# Patient Record
Sex: FEMALE | Race: WHITE
[De-identification: names, ages, dates, MRNs, and addresses within clinical notes are randomized per-mention and may not be internally consistent; named-entity substitution may affect disease eponyms.]

---

## 2018-07-13 ENCOUNTER — HOSPITAL ENCOUNTER (OUTPATIENT)
Dept: HOSPITAL 89 - MAMO | Age: 43
End: 2018-07-13
Attending: OBSTETRICS & GYNECOLOGY
Payer: COMMERCIAL

## 2018-07-13 DIAGNOSIS — Z12.31: Primary | ICD-10-CM

## 2018-07-13 PROCEDURE — 77063 BREAST TOMOSYNTHESIS BI: CPT

## 2018-07-13 PROCEDURE — 77067 SCR MAMMO BI INCL CAD: CPT

## 2018-07-17 NOTE — RADIOLOGY IMAGING REPORT
FACILITY: [facility] 

 

PATIENT NAME: LESLIE MOREJON

: [id number]

MR: [id number]

V: [id number]

EXAM DATE: [id number]

ORDERING PHYSICIAN: DERRICK FABIAN

TECHNOLOGIST: Annmarie Whiteside

 

PROCEDURE:BILATERAL DIGITAL SCREENING MAMMOGRAM WITH CAD ASSISTED 

INTERPRETATION & 3D TOMOSYNTHESIS 

 

COMPARISON:Baseline

 

INDICATIONS:SCREENING

 

FINDINGS:  

The breast tissue is extremely dense. There is no dominant mass, 

suspicious cluster of microcalcifications or persistent areas of 

architectural distortion.

 

DIAGNOSTIC CATEGORY 1--NEGATIVE.  

 

RECOMMENDATIONS:

ROUTINE MAMMOGRAM AND CLINICAL EVALUATION IN 1 YEAR.   

 

IMPRESSION:

BIRADS 1: Negative 

 

 

 

 

 

 

 

 

Dictated by:  Thomas Dunphy M.D. on 2018 at 8:57   

Transcribed by: FIX on 2018 at 9:36    

Approved by:  Thomas Dunphy M.D. on 2018 at 11:17   

[facility]

## 2024-01-29 ENCOUNTER — APPOINTMENT (OUTPATIENT)
Dept: URBAN - METROPOLITAN AREA CLINIC 217 | Age: 49
Setting detail: DERMATOLOGY
End: 2024-01-29

## 2024-01-29 DIAGNOSIS — L65.9 NONSCARRING HAIR LOSS, UNSPECIFIED: ICD-10-CM

## 2024-01-29 DIAGNOSIS — L81.4 OTHER MELANIN HYPERPIGMENTATION: ICD-10-CM

## 2024-01-29 PROBLEM — D37.01 NEOPLASM OF UNCERTAIN BEHAVIOR OF LIP: Status: ACTIVE | Noted: 2024-01-29

## 2024-01-29 PROCEDURE — 99204 OFFICE O/P NEW MOD 45 MIN: CPT

## 2024-01-29 PROCEDURE — OTHER DIAGNOSIS COMMENT: OTHER

## 2024-01-29 PROCEDURE — OTHER MIPS QUALITY: OTHER

## 2024-01-29 PROCEDURE — OTHER COUNSELING: OTHER

## 2024-01-29 PROCEDURE — OTHER OBSERVATION: OTHER

## 2024-01-29 PROCEDURE — OTHER TREATMENT REGIMEN: OTHER

## 2024-01-29 PROCEDURE — OTHER OBSERVATION AND MEASURE: OTHER

## 2024-01-29 PROCEDURE — OTHER ORDER TESTS: OTHER

## 2024-01-29 ASSESSMENT — LOCATION ZONE DERM
LOCATION ZONE: LIP
LOCATION ZONE: LIP

## 2024-01-29 ASSESSMENT — LOCATION DETAILED DESCRIPTION DERM
LOCATION DETAILED: LEFT INFERIOR VERMILION LIP
LOCATION DETAILED: LEFT INFERIOR VERMILION LIP

## 2024-01-29 ASSESSMENT — LOCATION SIMPLE DESCRIPTION DERM
LOCATION SIMPLE: LEFT LIP
LOCATION SIMPLE: LEFT LIP

## 2024-01-29 NOTE — PROCEDURE: DIAGNOSIS COMMENT
Render Risk Assessment In Note?: yes
Detail Level: Detailed
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 Family history hair loss (father). \\nR/o systemic disease.  HX anemia and Raynaud’s per patient. \\nDoes not seem to be affected by mechanical factors, stress.

## 2024-01-29 NOTE — PROCEDURE: ORDER TESTS
Billing Type: Third-Party Bill
Performing Laboratory: 0
Expected Date Of Service: 01/29/2024
Bill For Surgical Tray: no

## 2024-01-29 NOTE — PROCEDURE: TREATMENT REGIMEN
Detail Level: Zone
Plan: Discussed treatment options including PRP, low dose minoxidil. Patient and I mutually agree to OTC regimen.\\nWill also screen for any correctable contributory medical conditions that might be causing hairloss
Otc Regimen: Nutrafol oral supplements\\nMinoxidil 5% as directed topically
Initiate Treatment: Minoxidil 5% as directed topically

## 2024-01-29 NOTE — HPI: HAIR LOSS
How Did The Hair Loss Occur?: gradual in onset
How Severe Is Your Hair Loss?: mild
Additional History: Patient here for diffuse hairloss with a family Hx of hairloss ( father), Hx of anemia but no recent lab work,negative stress component or mechanical component. She does color her hair but air dries hair after washing  and infrequent curling. Positive for Raynaud’s disease. She does state she has scaly areas on the left fore head and behind ears at times that is itchy.  Selsum blue seems to help this issue.

## 2024-04-04 ENCOUNTER — RX ONLY (RX ONLY)
Age: 49
End: 2024-04-04

## 2024-04-04 RX ORDER — SPIRONOLACTONE 25 MG/5ML
SUSPENSION ORAL
Qty: 60 | Refills: 3 | Status: ERX | COMMUNITY
Start: 2024-04-04

## 2024-07-30 ENCOUNTER — APPOINTMENT (OUTPATIENT)
Dept: URBAN - METROPOLITAN AREA CLINIC 217 | Age: 49
Setting detail: DERMATOLOGY
End: 2024-07-30

## 2024-07-30 DIAGNOSIS — L65.9 NONSCARRING HAIR LOSS, UNSPECIFIED: ICD-10-CM

## 2024-07-30 PROCEDURE — OTHER DIAGNOSIS COMMENT: OTHER

## 2024-07-30 PROCEDURE — OTHER COUNSELING: OTHER

## 2024-07-30 PROCEDURE — 99214 OFFICE O/P EST MOD 30 MIN: CPT

## 2024-07-30 PROCEDURE — OTHER PRESCRIPTION: OTHER

## 2024-07-30 PROCEDURE — OTHER TREATMENT REGIMEN: OTHER

## 2024-07-30 RX ORDER — MINOXIDIL 2.5 MG/1
TABLET ORAL
Qty: 30 | Refills: 5 | Status: ERX | COMMUNITY
Start: 2024-07-30

## 2024-07-30 ASSESSMENT — SEVERITY ASSESSMENT OVERALL AMONG ALL PATIENTS
IN YOUR EXPERIENCE, AMONG ALL PATIENTS YOU HAVE SEEN WITH THIS CONDITION, HOW SEVERE IS THIS PATIENT'S CONDITION?: S1 (LESS THAN 25% HAIR LOSS)

## 2024-07-30 NOTE — PROCEDURE: COUNSELING
Patient Specific Counseling (Will Not Stick From Patient To Patient): Encouraged to use Nutrafol.\\n
Detail Level: Detailed

## 2024-07-30 NOTE — PROCEDURE: TREATMENT REGIMEN
Detail Level: Zone
Plan: Discussed treatment options including PRP, low dose minoxidil. Patient and I mutually agree to OTC regimen.\\nPatient has a hx of PVC and cardiologist stated minoxidil is not causing these issues per patient. \\nPatient agrees low dose minoxidil 0.25mg po daily
Continue Regimen: Minoxidil 5% as directed topically
Otc Regimen: Nutrafol oral supplements\\nMinoxidil 5% as directed topically
Initiate Treatment: Minoxidil 0.25mg po daily   [facility]